# Patient Record
Sex: MALE | URBAN - METROPOLITAN AREA
[De-identification: names, ages, dates, MRNs, and addresses within clinical notes are randomized per-mention and may not be internally consistent; named-entity substitution may affect disease eponyms.]

---

## 2018-01-25 ENCOUNTER — IMPORTED ENCOUNTER (OUTPATIENT)
Dept: URBAN - METROPOLITAN AREA CLINIC 9 | Facility: CLINIC | Age: 48
End: 2018-01-25

## 2018-03-20 ENCOUNTER — IMPORTED ENCOUNTER (OUTPATIENT)
Dept: URBAN - METROPOLITAN AREA CLINIC 9 | Facility: CLINIC | Age: 48
End: 2018-03-20

## 2019-05-08 NOTE — PATIENT DISCUSSION
Patient understands condition, prognosis and need for follow up care. Writer spoke with ER charge nurse at [facility] to inform them patient and family were on the way to ER.

## 2019-05-23 NOTE — PROCEDURE NOTE: SURGICAL
"<span style=""font-weight:bold;"">MR #:</span> [id number] /><br /><span style=""font-weight:bold;"">PREOPERATIVE DIAGNOSIS:</span> Cataract

## 2019-05-30 NOTE — PATIENT DISCUSSION
Patient advised of the right to post-operative care by the surgeon. Patient is fully informed of, and agreed to, co-management with their primary optometric physician. Post-operative care by the surgeon is not medically necessary and co-management is clinically appropriate. Patient has received itemization of fees related to cataract surgery. Transfer of care letter completed for the patient. Transfer care of RIGHT eye to Dr. Mae Will on 5/30/19. Patient instructed to call immediately if any new distortion, blurring, decreased vision or eye pain.

## 2019-05-30 NOTE — PROCEDURE NOTE: SURGICAL
"<span style=""font-weight:bold;"">MR #:</span> [id number] /><br /><span style=""font-weight:bold;"">PREOPERATIVE DIAGNOSIS:</span> Cataract

## 2020-09-14 ENCOUNTER — IMPORTED ENCOUNTER (OUTPATIENT)
Dept: URBAN - METROPOLITAN AREA CLINIC 9 | Facility: CLINIC | Age: 50
End: 2020-09-14

## 2021-10-15 ASSESSMENT — TONOMETRY
OD_IOP_MMHG: 18
OS_IOP_MMHG: 18
OS_IOP_MMHG: 22
OD_IOP_MMHG: 18
OD_IOP_MMHG: 22
OS_IOP_MMHG: 16
OD_IOP_MMHG: 28
OS_IOP_MMHG: 30

## 2021-10-15 ASSESSMENT — VISUAL ACUITY
OD_CC: 20/25 SN
OS_CC: 20/25 - SN
OS_CC: 20/25 - SN
OD_CC: 20/25 SN
OS_CC: 20/30 SN
OD_CC: 20/25 SN
OS_CC: 20/25 -2 SN
OS_CC: 20/25 SN
OD_CC: 20/25 - SN

## 2021-10-15 ASSESSMENT — KERATOMETRY
OD_AXISANGLE2_DEGREES: 92
OD_AXISANGLE_DEGREES: 2
OS_AXISANGLE_DEGREES: 165
OD_K2POWER_DIOPTERS: 44.25
OS_K2POWER_DIOPTERS: 44.5
OS_K1POWER_DIOPTERS: 40.75
OS_AXISANGLE2_DEGREES: 75
OD_K1POWER_DIOPTERS: 40.75

## 2021-10-15 ASSESSMENT — PACHYMETRY
OD_CT_UM: 547.0
OS_CT_UM: 530.0

## 2022-07-08 RX ORDER — TADALAFIL 2.5 MG/1
TABLET ORAL
COMMUNITY

## 2022-07-08 RX ORDER — LANSOPRAZOLE 30 MG/1
1 CAPSULE, DELAYED RELEASE ORAL
COMMUNITY

## 2022-07-08 RX ORDER — FLUOCINOLONE ACETONIDE 0.11 MG/ML
OIL TOPICAL
COMMUNITY
Start: 2022-03-10 | End: 2022-09-09 | Stop reason: SDUPTHER

## 2022-07-08 RX ORDER — TADALAFIL 5 MG/1
TABLET ORAL
COMMUNITY
Start: 2021-06-02 | End: 2022-08-30

## 2022-07-08 RX ORDER — FERROUS SULFATE 325(65) MG
TABLET ORAL
COMMUNITY

## 2022-07-08 RX ORDER — FLUTICASONE PROPIONATE 50 MCG
SPRAY, SUSPENSION (ML) NASAL
COMMUNITY

## 2022-07-08 RX ORDER — GUAIFENESIN AND CODEINE PHOSPHATE 100; 10 MG/5ML; MG/5ML
SOLUTION ORAL
COMMUNITY
Start: 2022-02-25

## 2022-07-08 RX ORDER — KETOCONAZOLE 20 MG/ML
SHAMPOO TOPICAL
COMMUNITY
Start: 2022-03-10 | End: 2022-09-09 | Stop reason: SDUPTHER

## 2022-07-08 RX ORDER — ALLOPURINOL 300 MG/1
TABLET ORAL
COMMUNITY

## 2022-07-21 PROBLEM — R32 URINARY INCONTINENCE: Status: ACTIVE | Noted: 2022-07-21

## 2022-07-21 PROBLEM — N52.9 ERECTILE DYSFUNCTION: Status: ACTIVE | Noted: 2022-07-21

## 2022-07-21 PROBLEM — I10 HYPERTENSION: Status: ACTIVE | Noted: 2022-07-21

## 2022-07-21 PROBLEM — F32.A DEPRESSION: Status: ACTIVE | Noted: 2022-07-21

## 2022-07-21 PROBLEM — R73.03 PREDIABETES: Status: ACTIVE | Noted: 2022-07-21

## 2022-07-21 PROBLEM — D50.9 IRON DEFICIENCY ANEMIA: Status: ACTIVE | Noted: 2022-07-21

## 2022-07-21 PROBLEM — N39.41 URGE INCONTINENCE OF URINE: Status: ACTIVE | Noted: 2022-07-21

## 2022-07-21 PROBLEM — G47.30 SLEEP APNEA: Status: ACTIVE | Noted: 2022-07-21

## 2022-07-21 PROBLEM — Z90.3 HISTORY OF SLEEVE GASTRECTOMY: Status: ACTIVE | Noted: 2022-07-21

## 2022-07-21 PROBLEM — E78.5 HYPERLIPIDEMIA: Status: ACTIVE | Noted: 2022-07-21

## 2022-07-21 PROBLEM — R05.9 COUGH: Status: ACTIVE | Noted: 2022-07-21

## 2022-07-21 PROBLEM — K43.9 VENTRAL HERNIA WITHOUT OBSTRUCTION OR GANGRENE: Status: ACTIVE | Noted: 2022-07-21

## 2022-07-21 PROBLEM — K91.2 POSTOPERATIVE MALABSORPTION: Status: ACTIVE | Noted: 2022-07-21

## 2022-07-21 PROBLEM — C61 PROSTATE CANCER (HCC): Status: ACTIVE | Noted: 2022-07-21

## 2022-07-21 PROBLEM — E66.01 MORBID OBESITY DUE TO EXCESS CALORIES (HCC): Status: ACTIVE | Noted: 2022-07-21

## 2022-07-21 PROBLEM — E29.1 HYPOGONADISM IN MALE: Status: ACTIVE | Noted: 2022-07-21

## 2022-07-21 PROBLEM — K21.9 GASTROESOPHAGEAL REFLUX DISEASE: Status: ACTIVE | Noted: 2022-07-21

## 2022-07-21 PROBLEM — E55.9 VITAMIN D DEFICIENCY: Status: ACTIVE | Noted: 2022-07-21

## 2022-08-20 PROBLEM — R05.9 COUGH: Status: RESOLVED | Noted: 2022-07-21 | Resolved: 2022-08-20

## 2022-09-12 PROBLEM — S31.109A OPEN ABDOMINAL WALL WOUND: Status: ACTIVE | Noted: 2022-09-12
